# Patient Record
Sex: FEMALE | Race: WHITE | NOT HISPANIC OR LATINO | Employment: FULL TIME | URBAN - METROPOLITAN AREA
[De-identification: names, ages, dates, MRNs, and addresses within clinical notes are randomized per-mention and may not be internally consistent; named-entity substitution may affect disease eponyms.]

---

## 2023-06-07 ENCOUNTER — LAB (OUTPATIENT)
Dept: LAB | Facility: HOSPITAL | Age: 28
End: 2023-06-07
Payer: COMMERCIAL

## 2023-06-07 ENCOUNTER — TRANSCRIBE ORDERS (OUTPATIENT)
Dept: PREADMISSION TESTING | Facility: HOSPITAL | Age: 28
End: 2023-06-07
Payer: COMMERCIAL

## 2023-06-07 ENCOUNTER — TRANSCRIBE ORDERS (OUTPATIENT)
Dept: ADMINISTRATIVE | Facility: HOSPITAL | Age: 28
End: 2023-06-07
Payer: COMMERCIAL

## 2023-06-07 DIAGNOSIS — Z00.00 ROUTINE GENERAL MEDICAL EXAMINATION AT A HEALTH CARE FACILITY: ICD-10-CM

## 2023-06-07 DIAGNOSIS — Z00.00 ROUTINE GENERAL MEDICAL EXAMINATION AT A HEALTH CARE FACILITY: Primary | ICD-10-CM

## 2023-06-07 LAB
ALBUMIN SERPL-MCNC: 4.4 G/DL (ref 3.5–5.2)
ALBUMIN/GLOB SERPL: 1.5 G/DL
ALP SERPL-CCNC: 84 U/L (ref 39–117)
ALT SERPL W P-5'-P-CCNC: 28 U/L (ref 1–33)
ANION GAP SERPL CALCULATED.3IONS-SCNC: 9 MMOL/L (ref 5–15)
AST SERPL-CCNC: 22 U/L (ref 1–32)
BASOPHILS # BLD AUTO: 0.03 10*3/MM3 (ref 0–0.2)
BASOPHILS NFR BLD AUTO: 0.3 % (ref 0–1.5)
BILIRUB SERPL-MCNC: 0.5 MG/DL (ref 0–1.2)
BILIRUB UR QL STRIP: NEGATIVE
BUN SERPL-MCNC: 11 MG/DL (ref 6–20)
BUN/CREAT SERPL: 14.1 (ref 7–25)
CALCIUM SPEC-SCNC: 9.6 MG/DL (ref 8.6–10.5)
CHLORIDE SERPL-SCNC: 105 MMOL/L (ref 98–107)
CHOLEST SERPL-MCNC: 188 MG/DL (ref 0–200)
CLARITY UR: ABNORMAL
CO2 SERPL-SCNC: 25 MMOL/L (ref 22–29)
COLOR UR: YELLOW
CREAT SERPL-MCNC: 0.78 MG/DL (ref 0.57–1)
DEPRECATED RDW RBC AUTO: 38.5 FL (ref 37–54)
EGFRCR SERPLBLD CKD-EPI 2021: 105.6 ML/MIN/1.73
EOSINOPHIL # BLD AUTO: 0.08 10*3/MM3 (ref 0–0.4)
EOSINOPHIL NFR BLD AUTO: 0.8 % (ref 0.3–6.2)
ERYTHROCYTE [DISTWIDTH] IN BLOOD BY AUTOMATED COUNT: 11.9 % (ref 12.3–15.4)
GLOBULIN UR ELPH-MCNC: 2.9 GM/DL
GLUCOSE SERPL-MCNC: 112 MG/DL (ref 65–99)
GLUCOSE UR STRIP-MCNC: NEGATIVE MG/DL
HCT VFR BLD AUTO: 39.7 % (ref 34–46.6)
HDLC SERPL-MCNC: 38 MG/DL (ref 40–60)
HGB BLD-MCNC: 13.5 G/DL (ref 12–15.9)
HGB UR QL STRIP.AUTO: NEGATIVE
IMM GRANULOCYTES # BLD AUTO: 0.03 10*3/MM3 (ref 0–0.05)
IMM GRANULOCYTES NFR BLD AUTO: 0.3 % (ref 0–0.5)
KETONES UR QL STRIP: NEGATIVE
LDLC SERPL CALC-MCNC: 129 MG/DL (ref 0–100)
LDLC/HDLC SERPL: 3.34 {RATIO}
LEUKOCYTE ESTERASE UR QL STRIP.AUTO: NEGATIVE
LYMPHOCYTES # BLD AUTO: 3.34 10*3/MM3 (ref 0.7–3.1)
LYMPHOCYTES NFR BLD AUTO: 34.3 % (ref 19.6–45.3)
MCH RBC QN AUTO: 30.4 PG (ref 26.6–33)
MCHC RBC AUTO-ENTMCNC: 34 G/DL (ref 31.5–35.7)
MCV RBC AUTO: 89.4 FL (ref 79–97)
MONOCYTES # BLD AUTO: 0.68 10*3/MM3 (ref 0.1–0.9)
MONOCYTES NFR BLD AUTO: 7 % (ref 5–12)
NEUTROPHILS NFR BLD AUTO: 5.59 10*3/MM3 (ref 1.7–7)
NEUTROPHILS NFR BLD AUTO: 57.3 % (ref 42.7–76)
NITRITE UR QL STRIP: NEGATIVE
NRBC BLD AUTO-RTO: 0 /100 WBC (ref 0–0.2)
PH UR STRIP.AUTO: 6 [PH] (ref 5–8)
PLATELET # BLD AUTO: 344 10*3/MM3 (ref 140–450)
PMV BLD AUTO: 9.8 FL (ref 6–12)
POTASSIUM SERPL-SCNC: 4.3 MMOL/L (ref 3.5–5.2)
PROT SERPL-MCNC: 7.3 G/DL (ref 6–8.5)
PROT UR QL STRIP: NEGATIVE
RBC # BLD AUTO: 4.44 10*6/MM3 (ref 3.77–5.28)
SODIUM SERPL-SCNC: 139 MMOL/L (ref 136–145)
SP GR UR STRIP: 1.02 (ref 1–1.03)
TRIGL SERPL-MCNC: 116 MG/DL (ref 0–150)
UROBILINOGEN UR QL STRIP: ABNORMAL
VLDLC SERPL-MCNC: 21 MG/DL (ref 5–40)
WBC NRBC COR # BLD: 9.75 10*3/MM3 (ref 3.4–10.8)

## 2023-06-07 PROCEDURE — 85025 COMPLETE CBC W/AUTO DIFF WBC: CPT

## 2023-06-07 PROCEDURE — 36415 COLL VENOUS BLD VENIPUNCTURE: CPT

## 2023-06-07 PROCEDURE — 80061 LIPID PANEL: CPT

## 2023-06-07 PROCEDURE — 81003 URINALYSIS AUTO W/O SCOPE: CPT

## 2023-06-07 PROCEDURE — 80053 COMPREHEN METABOLIC PANEL: CPT

## 2023-06-13 ENCOUNTER — LAB (OUTPATIENT)
Dept: LAB | Facility: HOSPITAL | Age: 28
End: 2023-06-13
Payer: COMMERCIAL

## 2023-06-13 ENCOUNTER — TRANSCRIBE ORDERS (OUTPATIENT)
Dept: ADMINISTRATIVE | Facility: HOSPITAL | Age: 28
End: 2023-06-13
Payer: COMMERCIAL

## 2023-06-13 DIAGNOSIS — R73.03 PREDIABETES: ICD-10-CM

## 2023-06-13 DIAGNOSIS — R63.5 WEIGHT GAIN: ICD-10-CM

## 2023-06-13 DIAGNOSIS — R73.03 PREDIABETES: Primary | ICD-10-CM

## 2023-06-13 LAB
HBA1C MFR BLD: 5.1 % (ref 4.8–5.6)
HCG INTACT+B SERPL-ACNC: <1 MIU/ML
TSH SERPL DL<=0.05 MIU/L-ACNC: 1.89 UIU/ML (ref 0.27–4.2)

## 2023-06-13 PROCEDURE — 84702 CHORIONIC GONADOTROPIN TEST: CPT

## 2023-06-13 PROCEDURE — 84443 ASSAY THYROID STIM HORMONE: CPT

## 2023-06-13 PROCEDURE — 83036 HEMOGLOBIN GLYCOSYLATED A1C: CPT

## 2023-06-13 PROCEDURE — 36415 COLL VENOUS BLD VENIPUNCTURE: CPT

## 2023-08-04 ENCOUNTER — OFFICE VISIT (OUTPATIENT)
Dept: OBSTETRICS AND GYNECOLOGY | Age: 28
End: 2023-08-04
Payer: COMMERCIAL

## 2023-08-04 VITALS
SYSTOLIC BLOOD PRESSURE: 118 MMHG | BODY MASS INDEX: 43.74 KG/M2 | HEIGHT: 68 IN | DIASTOLIC BLOOD PRESSURE: 64 MMHG | WEIGHT: 288.6 LBS

## 2023-08-04 DIAGNOSIS — Z76.89 ENCOUNTER TO ESTABLISH CARE: ICD-10-CM

## 2023-08-04 DIAGNOSIS — Z12.4 SCREENING FOR CERVICAL CANCER: ICD-10-CM

## 2023-08-04 DIAGNOSIS — Z01.419 WELL WOMAN EXAM WITH ROUTINE GYNECOLOGICAL EXAM: Primary | ICD-10-CM

## 2023-08-04 RX ORDER — SEMAGLUTIDE 0.5 MG/.5ML
INJECTION, SOLUTION SUBCUTANEOUS
COMMUNITY
Start: 2023-07-24

## 2023-08-07 ENCOUNTER — PATIENT ROUNDING (BHMG ONLY) (OUTPATIENT)
Dept: OBSTETRICS AND GYNECOLOGY | Age: 28
End: 2023-08-07
Payer: COMMERCIAL

## 2023-08-07 NOTE — PROGRESS NOTES
A MY CHART MESSAGE HAS BEEN SENT TO THE PATIENT FOR [facility] ROUNDING.

## 2023-08-09 LAB
CONV .: NORMAL
CYTOLOGIST CVX/VAG CYTO: NORMAL
CYTOLOGY CVX/VAG DOC CYTO: NORMAL
CYTOLOGY CVX/VAG DOC THIN PREP: NORMAL
DX ICD CODE: NORMAL
HIV 1 & 2 AB SER-IMP: NORMAL
OTHER STN SPEC: NORMAL
STAT OF ADQ CVX/VAG CYTO-IMP: NORMAL

## 2023-08-29 ENCOUNTER — HOSPITAL ENCOUNTER (OUTPATIENT)
Dept: ULTRASOUND IMAGING | Facility: HOSPITAL | Age: 28
Discharge: HOME | End: 2023-08-29
Admitting: INTERNAL MEDICINE
Payer: COMMERCIAL

## 2023-08-29 ENCOUNTER — HOSPITAL ENCOUNTER (OUTPATIENT)
Dept: MAMMOGRAPHY | Facility: HOSPITAL | Age: 28
End: 2023-08-29
Payer: COMMERCIAL

## 2023-08-29 DIAGNOSIS — N63.20 MASS OF LEFT BREAST, UNSPECIFIED QUADRANT: ICD-10-CM

## 2023-08-29 PROCEDURE — 76642 ULTRASOUND BREAST LIMITED: CPT

## 2024-04-02 ENCOUNTER — LAB (OUTPATIENT)
Dept: LAB | Facility: HOSPITAL | Age: 29
End: 2024-04-02
Payer: COMMERCIAL

## 2024-04-02 ENCOUNTER — TRANSCRIBE ORDERS (OUTPATIENT)
Dept: ADMINISTRATIVE | Facility: HOSPITAL | Age: 29
End: 2024-04-02
Payer: COMMERCIAL

## 2024-04-02 DIAGNOSIS — R53.83 TIREDNESS: ICD-10-CM

## 2024-04-02 DIAGNOSIS — Z00.00 ROUTINE GENERAL MEDICAL EXAMINATION AT A HEALTH CARE FACILITY: ICD-10-CM

## 2024-04-02 DIAGNOSIS — R53.83 TIREDNESS: Primary | ICD-10-CM

## 2024-04-02 LAB
ALBUMIN SERPL-MCNC: 4.2 G/DL (ref 3.5–5.2)
ALBUMIN/GLOB SERPL: 1.6 G/DL
ALP SERPL-CCNC: 71 U/L (ref 39–117)
ALT SERPL W P-5'-P-CCNC: 10 U/L (ref 1–33)
ANION GAP SERPL CALCULATED.3IONS-SCNC: 8.1 MMOL/L (ref 5–15)
AST SERPL-CCNC: 20 U/L (ref 1–32)
BASOPHILS # BLD AUTO: 0.04 10*3/MM3 (ref 0–0.2)
BASOPHILS NFR BLD AUTO: 0.5 % (ref 0–1.5)
BILIRUB SERPL-MCNC: 0.4 MG/DL (ref 0–1.2)
BILIRUB UR QL STRIP: NEGATIVE
BUN SERPL-MCNC: 17 MG/DL (ref 6–20)
BUN/CREAT SERPL: 23.9 (ref 7–25)
CALCIUM SPEC-SCNC: 9.2 MG/DL (ref 8.6–10.5)
CHLORIDE SERPL-SCNC: 104 MMOL/L (ref 98–107)
CHOLEST SERPL-MCNC: 148 MG/DL (ref 0–200)
CLARITY UR: CLEAR
CO2 SERPL-SCNC: 24.9 MMOL/L (ref 22–29)
COLOR UR: YELLOW
CREAT SERPL-MCNC: 0.71 MG/DL (ref 0.57–1)
DEPRECATED RDW RBC AUTO: 38.9 FL (ref 37–54)
EGFRCR SERPLBLD CKD-EPI 2021: 117.5 ML/MIN/1.73
EOSINOPHIL # BLD AUTO: 0.07 10*3/MM3 (ref 0–0.4)
EOSINOPHIL NFR BLD AUTO: 0.8 % (ref 0.3–6.2)
ERYTHROCYTE [DISTWIDTH] IN BLOOD BY AUTOMATED COUNT: 11.8 % (ref 12.3–15.4)
GLOBULIN UR ELPH-MCNC: 2.7 GM/DL
GLUCOSE SERPL-MCNC: 83 MG/DL (ref 65–99)
GLUCOSE UR STRIP-MCNC: NEGATIVE MG/DL
HBA1C MFR BLD: 4.8 % (ref 4.8–5.6)
HCT VFR BLD AUTO: 39.4 % (ref 34–46.6)
HDLC SERPL-MCNC: 35 MG/DL (ref 40–60)
HGB BLD-MCNC: 13.2 G/DL (ref 12–15.9)
HGB UR QL STRIP.AUTO: NEGATIVE
IMM GRANULOCYTES # BLD AUTO: 0.01 10*3/MM3 (ref 0–0.05)
IMM GRANULOCYTES NFR BLD AUTO: 0.1 % (ref 0–0.5)
KETONES UR QL STRIP: NEGATIVE
LDLC SERPL CALC-MCNC: 98 MG/DL (ref 0–100)
LDLC/HDLC SERPL: 2.78 {RATIO}
LEUKOCYTE ESTERASE UR QL STRIP.AUTO: NEGATIVE
LYMPHOCYTES # BLD AUTO: 2.73 10*3/MM3 (ref 0.7–3.1)
LYMPHOCYTES NFR BLD AUTO: 32.7 % (ref 19.6–45.3)
MCH RBC QN AUTO: 30.4 PG (ref 26.6–33)
MCHC RBC AUTO-ENTMCNC: 33.5 G/DL (ref 31.5–35.7)
MCV RBC AUTO: 90.8 FL (ref 79–97)
MONOCYTES # BLD AUTO: 0.68 10*3/MM3 (ref 0.1–0.9)
MONOCYTES NFR BLD AUTO: 8.1 % (ref 5–12)
NEUTROPHILS NFR BLD AUTO: 4.82 10*3/MM3 (ref 1.7–7)
NEUTROPHILS NFR BLD AUTO: 57.8 % (ref 42.7–76)
NITRITE UR QL STRIP: NEGATIVE
NRBC BLD AUTO-RTO: 0 /100 WBC (ref 0–0.2)
PH UR STRIP.AUTO: 6.5 [PH] (ref 5–8)
PLATELET # BLD AUTO: 283 10*3/MM3 (ref 140–450)
PMV BLD AUTO: 9.9 FL (ref 6–12)
POTASSIUM SERPL-SCNC: 4.2 MMOL/L (ref 3.5–5.2)
PROT SERPL-MCNC: 6.9 G/DL (ref 6–8.5)
PROT UR QL STRIP: NEGATIVE
RBC # BLD AUTO: 4.34 10*6/MM3 (ref 3.77–5.28)
SODIUM SERPL-SCNC: 137 MMOL/L (ref 136–145)
SP GR UR STRIP: 1.02 (ref 1–1.03)
TRIGL SERPL-MCNC: 78 MG/DL (ref 0–150)
TSH SERPL DL<=0.05 MIU/L-ACNC: 1.31 UIU/ML (ref 0.27–4.2)
UROBILINOGEN UR QL STRIP: NORMAL
VLDLC SERPL-MCNC: 15 MG/DL (ref 5–40)
WBC NRBC COR # BLD AUTO: 8.35 10*3/MM3 (ref 3.4–10.8)

## 2024-04-02 PROCEDURE — 80061 LIPID PANEL: CPT

## 2024-04-02 PROCEDURE — 80050 GENERAL HEALTH PANEL: CPT

## 2024-04-02 PROCEDURE — 36415 COLL VENOUS BLD VENIPUNCTURE: CPT

## 2024-04-02 PROCEDURE — 81003 URINALYSIS AUTO W/O SCOPE: CPT

## 2024-04-02 PROCEDURE — 83036 HEMOGLOBIN GLYCOSYLATED A1C: CPT

## 2024-08-09 ENCOUNTER — OFFICE VISIT (OUTPATIENT)
Dept: OBSTETRICS AND GYNECOLOGY | Age: 29
End: 2024-08-09
Payer: COMMERCIAL

## 2024-08-09 VITALS
BODY MASS INDEX: 31.71 KG/M2 | SYSTOLIC BLOOD PRESSURE: 128 MMHG | HEIGHT: 68 IN | WEIGHT: 209.2 LBS | DIASTOLIC BLOOD PRESSURE: 84 MMHG

## 2024-08-09 DIAGNOSIS — Z01.419 WELL WOMAN EXAM WITH ROUTINE GYNECOLOGICAL EXAM: Primary | ICD-10-CM

## 2024-08-09 PROCEDURE — 99395 PREV VISIT EST AGE 18-39: CPT | Performed by: STUDENT IN AN ORGANIZED HEALTH CARE EDUCATION/TRAINING PROGRAM

## 2024-08-09 RX ORDER — MELOXICAM 15 MG/1
1 TABLET ORAL DAILY
COMMUNITY
Start: 2024-05-01

## 2024-08-09 RX ORDER — SEMAGLUTIDE 2.4 MG/.75ML
INJECTION, SOLUTION SUBCUTANEOUS
COMMUNITY
Start: 2024-07-20

## 2024-08-09 NOTE — PROGRESS NOTES
[facility]   Obstetrics and Gynecology   Routine Annual Visit    2024    Patient: Gladys Tate          MR#:[id number]    History of Present Illness    Chief Complaint   Patient presents with    Routine Prenatal Visit     AE today, Last AE 2023 w/pap nml, No problems today       29 y.o. female  who presents for annual exam.  Reports regular monthly menses without issue.  Using condoms for contraception.  Thinking about trying to conceive.  She has been using Wegovy and lost >100#.  Her main goal was optimize health for pregnancy so she is excited.  She wants to discuss when to stop Wegovy in preparation for pregnancy.  She has not yet started prenatal vitamin.    Fhx PGM with breast cancer (diagnosed in mid-70s)  US Breast Left Limited (2023 09:04) normal breast US     -Older sister with T21, mother was in late-30s when she was born  -Younger brother born with two holes in heart and had surgical repair soon after birth     Studies reviewed:  IGP,rfx Aptima HPV All Pth (2023 14:33)  NIL, denies h/o abnormal paps, s/p gardasil    Obstetric History:  OB History          0    Para   0    Term   0       0    AB   0    Living   0         SAB   0    IAB   0    Ectopic   0    Molar        Multiple   0    Live Births                   Menstrual History:     Patient's last menstrual period was 2024 (exact date).       Sexual History:   Sexually active with , declines STD testing       Social History:   Behavior analyst for [employer]  Has one foster son at home  Planning trip to [place] this year - [place], [place], [place]  ________________________________________  There is no problem list on file for this patient.    Past Medical History:   Diagnosis Date    No pertinent past medical history      Past Surgical History:   Procedure Laterality Date    BILATERAL BREAST REDUCTION Bilateral 2014    WISDOM TOOTH EXTRACTION       Social History     Tobacco  "Use   Smoking Status Never    Passive exposure: Never   Smokeless Tobacco Never     Family History   Problem Relation Age of Onset    Hypertension Father     Hyperlipidemia Father     No Known Problems Mother     Breast cancer Paternal Grandmother 70    Ovarian cancer Neg Hx     Uterine cancer Neg Hx     Colon cancer Neg Hx      Prior to Admission medications    Medication Sig Start Date End Date Taking? Authorizing Provider   meloxicam (MOBIC) 15 MG tablet Take 1 tablet by mouth Daily. 5/1/24  Yes Provider, Historical, MD   Wegovy 2.4 MG/0.75ML solution auto-injector INJECT THE CONTENTS OF ONE PEN BELOW THE SKIN INTO THE APPROPRIATE AREA ONCE WEEKLY AS DIRECTED 7/20/24  Yes Toan Thomas MD Wegovy 0.5 MG/0.5ML solution auto-injector  7/24/23   Toan Thomas MD     ________________________________________    Current contraception: condoms  History of abnormal Pap smear: no  Family history of uterine or ovarian cancer: no  Family History of colon cancer/colon polyps: no  History of abnormal mammogram: no    The following portions of the patient's history were reviewed and updated as appropriate: allergies, current medications, past family history, past medical history, past social history, past surgical history, and problem list.    Review of Systems   All other systems reviewed and are negative.           Objective     /84   Ht 172.7 cm (68\")   Wt 94.9 kg (209 lb 3.2 oz)   LMP 07/23/2024 (Exact Date)   Breastfeeding No   BMI 31.81 kg/m²    BP Readings from Last 3 Encounters:   08/09/24 128/84   08/04/23 118/64   05/10/19 107/72      Wt Readings from Last 3 Encounters:   08/09/24 94.9 kg (209 lb 3.2 oz)   08/04/23 131 kg (288 lb 9.6 oz)   05/10/19 130 kg (286 lb)        BMI: Estimated body mass index is 31.81 kg/m² as calculated from the following:    Height as of this encounter: 172.7 cm (68\").    Weight as of this encounter: 94.9 kg (209 lb 3.2 oz).    Physical Exam  Vitals and nursing " note reviewed.   Constitutional:       General: She is not in acute distress.     Appearance: Normal appearance.   HENT:      Head: Normocephalic and atraumatic.   Eyes:      Extraocular Movements: Extraocular movements intact.   Cardiovascular:      Rate and Rhythm: Normal rate and regular rhythm.      Pulses: Normal pulses.      Heart sounds: No murmur heard.  Pulmonary:      Effort: Pulmonary effort is normal. No respiratory distress.      Breath sounds: Normal breath sounds.   Chest:   Breasts:     Right: Normal. No mass, nipple discharge, skin change or tenderness.      Left: Normal. No mass, nipple discharge, skin change or tenderness.   Abdominal:      General: There is no distension.      Palpations: Abdomen is soft. There is no mass.      Tenderness: There is no abdominal tenderness.   Genitourinary:     General: Normal vulva.      Labia:         Right: No rash or lesion.         Left: No rash or lesion.       Urethra: No prolapse, urethral swelling or urethral lesion.      Vagina: Normal.      Cervix: Normal.      Uterus: Normal.       Adnexa: Right adnexa normal and left adnexa normal.      Comments: Bladder: no masses or tenderness  Perineum/Anus: no masses, lesions, or skin changes  Musculoskeletal:         General: No swelling. Normal range of motion.      Cervical back: Normal range of motion.   Lymphadenopathy:      Upper Body:      Right upper body: No axillary adenopathy.      Left upper body: No axillary adenopathy.   Skin:     General: Skin is warm and dry.   Neurological:      General: No focal deficit present.      Mental Status: She is alert and oriented to person, place, and time.   Psychiatric:         Mood and Affect: Mood normal.         Behavior: Behavior normal.         As part of wellness and prevention, the following topics were discussed with the patient:  Encouraged self breast exam  Physical activity and regular exercised encouraged.   Injury prevention discussed.  Healthy weight  discussed.  Nutrition discussed.  Substance abuse/misuse discussed.  Sexual behavior/safe practices discussed.   Sexual transmitted disease prevention   Contraception discussed.   Mental health discussed.           Assessment:  Diagnoses and all orders for this visit:    1. Well woman exam with routine gynecological exam (Primary)    -Breast and pelvic exam normal  - Pap up-to-date  - Declined STD screen  - Discussed starting prenatal vitamin about.  Discussed option of referral to genetic counselor for consultation regarding family history of Down syndrome and karyotyping but patient declines.  Discussed that Down syndrome is most often a random mutation.  - Discussed that Wegovy is not recommended in pregnancy but the fetal risks have not been fully delineated by research.  She has achieved significant weight loss with Wegovy which we will absolutely improve her risks and experience of pregnancy.  She is okay for pregnancy whenever she likes.  I recommend stopping Wegovy before conceiving ideally but definitely if she has a positive UPT.      Plan:  Return in about 1 year (around 8/9/2025) for Annual.      Maddy Perea MD  8/9/2024 17:02 EDT

## 2025-01-13 ENCOUNTER — LAB (OUTPATIENT)
Dept: LAB | Facility: HOSPITAL | Age: 30
End: 2025-01-13
Payer: COMMERCIAL

## 2025-01-13 ENCOUNTER — OFFICE VISIT (OUTPATIENT)
Dept: INTERNAL MEDICINE | Facility: CLINIC | Age: 30
End: 2025-01-13
Payer: COMMERCIAL

## 2025-01-13 VITALS
WEIGHT: 196 LBS | BODY MASS INDEX: 29.7 KG/M2 | OXYGEN SATURATION: 97 % | RESPIRATION RATE: 16 BRPM | DIASTOLIC BLOOD PRESSURE: 70 MMHG | TEMPERATURE: 97.8 F | HEART RATE: 77 BPM | HEIGHT: 68 IN | SYSTOLIC BLOOD PRESSURE: 118 MMHG

## 2025-01-13 DIAGNOSIS — Z23 FLU VACCINE NEED: ICD-10-CM

## 2025-01-13 DIAGNOSIS — Z00.00 HEALTHCARE MAINTENANCE: ICD-10-CM

## 2025-01-13 DIAGNOSIS — R53.83 FATIGUE, UNSPECIFIED TYPE: ICD-10-CM

## 2025-01-13 DIAGNOSIS — G62.9 NEUROPATHY: ICD-10-CM

## 2025-01-13 DIAGNOSIS — R63.4 WEIGHT LOSS: Primary | ICD-10-CM

## 2025-01-13 PROBLEM — E66.9 OBESITY (BMI 30-39.9): Status: ACTIVE | Noted: 2025-01-13

## 2025-01-13 LAB
ALBUMIN SERPL-MCNC: 4.4 G/DL (ref 3.5–5.2)
ALBUMIN/GLOB SERPL: 1.5 G/DL
ALP SERPL-CCNC: 59 U/L (ref 39–117)
ALT SERPL W P-5'-P-CCNC: 13 U/L (ref 1–33)
ANION GAP SERPL CALCULATED.3IONS-SCNC: 11.9 MMOL/L (ref 5–15)
AST SERPL-CCNC: 23 U/L (ref 1–32)
BILIRUB SERPL-MCNC: 0.7 MG/DL (ref 0–1.2)
BUN SERPL-MCNC: 13 MG/DL (ref 6–20)
BUN/CREAT SERPL: 17.6 (ref 7–25)
CALCIUM SPEC-SCNC: 9.4 MG/DL (ref 8.6–10.5)
CHLORIDE SERPL-SCNC: 101 MMOL/L (ref 98–107)
CO2 SERPL-SCNC: 25.1 MMOL/L (ref 22–29)
CREAT SERPL-MCNC: 0.74 MG/DL (ref 0.57–1)
EGFRCR SERPLBLD CKD-EPI 2021: 111.8 ML/MIN/1.73
GLOBULIN UR ELPH-MCNC: 2.9 GM/DL
GLUCOSE SERPL-MCNC: 81 MG/DL (ref 65–99)
HCT VFR BLD AUTO: 40.6 % (ref 34–46.6)
HCV AB SER QL: NORMAL
HGB BLD-MCNC: 13.6 G/DL (ref 12–15.9)
POTASSIUM SERPL-SCNC: 4.1 MMOL/L (ref 3.5–5.2)
PROT SERPL-MCNC: 7.3 G/DL (ref 6–8.5)
SODIUM SERPL-SCNC: 138 MMOL/L (ref 136–145)
VIT B12 BLD-MCNC: 722 PG/ML (ref 211–946)

## 2025-01-13 PROCEDURE — 85018 HEMOGLOBIN: CPT | Performed by: INTERNAL MEDICINE

## 2025-01-13 PROCEDURE — 86803 HEPATITIS C AB TEST: CPT | Performed by: INTERNAL MEDICINE

## 2025-01-13 PROCEDURE — 99204 OFFICE O/P NEW MOD 45 MIN: CPT | Performed by: INTERNAL MEDICINE

## 2025-01-13 PROCEDURE — 90656 IIV3 VACC NO PRSV 0.5 ML IM: CPT | Performed by: INTERNAL MEDICINE

## 2025-01-13 PROCEDURE — 80053 COMPREHEN METABOLIC PANEL: CPT | Performed by: INTERNAL MEDICINE

## 2025-01-13 PROCEDURE — 90471 IMMUNIZATION ADMIN: CPT | Performed by: INTERNAL MEDICINE

## 2025-01-13 PROCEDURE — 85014 HEMATOCRIT: CPT | Performed by: INTERNAL MEDICINE

## 2025-01-13 PROCEDURE — 82607 VITAMIN B-12: CPT | Performed by: INTERNAL MEDICINE

## 2025-01-13 PROCEDURE — 36415 COLL VENOUS BLD VENIPUNCTURE: CPT | Performed by: INTERNAL MEDICINE

## 2025-01-13 RX ORDER — SEMAGLUTIDE 1.7 MG/.75ML
1.7 INJECTION, SOLUTION SUBCUTANEOUS WEEKLY
Qty: 3 ML | Refills: 6 | Status: SHIPPED | OUTPATIENT
Start: 2025-01-13

## 2025-01-13 RX ORDER — SEMAGLUTIDE 1.7 MG/.75ML
1.7 INJECTION, SOLUTION SUBCUTANEOUS WEEKLY
COMMUNITY
End: 2025-01-13 | Stop reason: SDUPTHER

## 2025-01-13 RX ORDER — MELOXICAM 15 MG/1
15 TABLET ORAL DAILY
Qty: 30 TABLET | Refills: 11 | Status: SHIPPED | OUTPATIENT
Start: 2025-01-13

## 2025-01-13 NOTE — PROGRESS NOTES
"Chief Complaint  WEGOVY F/U    Subjective        Gladys Tate presents to [facility]  History of Present Illness     History of Present Illness  The patient is a 29-year-old female who presents for a follow-up on obesity and maintenance of Wegovy.    She reports no adverse effects from the medication, with regular bowel movements and no instances of bloating or belching. Her current weight is 196 pounds, a significant decrease from her initial weight of 306 pounds. She has experienced episodes of lightheadedness, which she attributes to dietary changes. Additionally, she reports a sensation of instability in her legs upon standing, necessitating a brief pause before resuming movement. This symptom is not associated with any tingling sensation and does not interfere with her ability to walk. She maintains a regular exercise regimen, utilizing a treadmill and Peloton bike. She is currently on a 1.7 mg dose of Wegovy. She is not experiencing any difficulty swallowing.    She is actively trying to conceive and is not using any form of birth control, including condoms. Her menstrual cycles are regular and of normal flow. She is compliant with her prenatal vitamin regimen.    She takes meloxicam as needed, with decreased frequency following her weight loss. She last took it approximately one week ago for foot pain, which provided relief.    MEDICATIONS  Current: Wegovy, meloxicam    IMMUNIZATIONS  She has not received her influenza vaccine.      Objective   Vital Signs:  /70   Pulse 77   Temp 97.8 °F (36.6 °C)   Resp 16   Ht 172.7 cm (67.99\")   Wt 88.9 kg (196 lb)   SpO2 97%   BMI 29.81 kg/m²   Estimated body mass index is 29.81 kg/m² as calculated from the following:    Height as of this encounter: 172.7 cm (67.99\").    Weight as of this encounter: 88.9 kg (196 lb).            Past Medical History:   Diagnosis Date    Macromastia 06/03/2014    No pertinent past " medical history     Obesity         Family History   Problem Relation Age of Onset    Hypertension Father     Hyperlipidemia Father     No Known Problems Mother     Breast cancer Paternal Grandmother 70    Cancer Paternal Grandmother     Hypertension Paternal Grandmother     Hypertension Maternal Grandfather     Developmental Disability Sister     Developmental Disability Brother     Ovarian cancer Neg Hx     Uterine cancer Neg Hx     Colon cancer Neg Hx         Social History     Socioeconomic History    Marital status:    Tobacco Use    Smoking status: Never     Passive exposure: Never    Smokeless tobacco: Never   Vaping Use    Vaping status: Never Used   Substance and Sexual Activity    Alcohol use: Yes     Comment: Socially    Drug use: No    Sexual activity: Yes     Partners: Male     Birth control/protection: Condom        Review of Systems     Physical Exam  Vitals reviewed.   Constitutional:       Appearance: Normal appearance.   HENT:      Head: Normocephalic and atraumatic.   Eyes:      Extraocular Movements: Extraocular movements intact.      Conjunctiva/sclera: Conjunctivae normal.      Pupils: Pupils are equal, round, and reactive to light.   Cardiovascular:      Rate and Rhythm: Normal rate and regular rhythm.      Pulses: Normal pulses.      Heart sounds: Normal heart sounds.   Pulmonary:      Effort: Pulmonary effort is normal.      Breath sounds: Normal breath sounds.   Abdominal:      General: Bowel sounds are normal.      Palpations: Abdomen is soft.   Musculoskeletal:         General: Normal range of motion.      Cervical back: Normal range of motion and neck supple.   Skin:     General: Skin is warm and dry.   Neurological:      General: No focal deficit present.      Mental Status: She is alert. Mental status is at baseline.   Psychiatric:         Mood and Affect: Mood normal.         Behavior: Behavior normal.         Thought Content: Thought content normal.          Result Review  :                Assessment and Plan   Diagnoses and all orders for this visit:    1. Weight loss (Primary)  -     Comprehensive metabolic panel  -     Wegovy 1.7 MG/0.75ML solution auto-injector; 0.75 mL by Subdermal route 1 (One) Time Per Week.  Dispense: 3 mL; Refill: 6    2. Neuropathy  -     meloxicam (MOBIC) 15 MG tablet; Take 1 tablet by mouth Daily.  Dispense: 30 tablet; Refill: 11    3. Fatigue, unspecified type  -     Vitamin B12  -     Hemoglobin and hematocrit, blood    4. Healthcare maintenance  -     Hepatitis C antibody    5. Flu vaccine need  -     Fluzone >6mos ([id number])        Assessment & Plan  1. Obesity.  She has achieved a significant weight loss of 110 pounds, reducing her weight from 306 to 196 pounds. Her current BMI is 29.8. The goal is to further reduce her weight by an additional 20 pounds, aiming for a BMI of 25. A comprehensive metabolic panel (CMP), complete blood count (CBC), and vitamin level assessment will be conducted today to rule out malnutrition. She is advised to continue her current regimen of Wegovy 1.7 mg. A prescription for Wegovy will be renewed. If she becomes pregnant, she should discontinue Wegovy immediately.    2. Prenatal care.  She is currently trying to conceive and is not using any form of birth control. She is advised to stop Wegovy immediately upon confirmation of pregnancy. She is currently taking a prenatal vitamin and should continue to do so.    3. Medication management.  A prescription for meloxicam will be renewed for use as needed.    4. Health maintenance.  She will receive her influenza vaccine today. She is advised to get her COVID-19 vaccine when available.    Follow-up  The patient will follow up in 6 months.            Follow Up   Return in about 6 months (around 7/13/2025).  Patient was given instructions and counseling regarding her condition or for health maintenance advice. Please see specific information pulled into the AVS if appropriate.            Patient or patient representative verbalized consent for the use of Ambient Listening during the visit with  Reji Yang MD for chart documentation. 1/13/2025  10:47 EST

## 2025-02-28 ENCOUNTER — PATIENT MESSAGE (OUTPATIENT)
Dept: OBSTETRICS AND GYNECOLOGY | Age: 30
End: 2025-02-28

## 2025-02-28 ENCOUNTER — OFFICE VISIT (OUTPATIENT)
Dept: OBSTETRICS AND GYNECOLOGY | Age: 30
End: 2025-02-28
Payer: COMMERCIAL

## 2025-02-28 VITALS
SYSTOLIC BLOOD PRESSURE: 120 MMHG | WEIGHT: 208.2 LBS | BODY MASS INDEX: 31.55 KG/M2 | DIASTOLIC BLOOD PRESSURE: 76 MMHG | HEIGHT: 68 IN

## 2025-02-28 DIAGNOSIS — Z32.00 ENCOUNTER FOR CONFIRMATION OF PREGNANCY TEST RESULT WITH PHYSICAL EXAMINATION: Primary | ICD-10-CM

## 2025-02-28 DIAGNOSIS — Z82.79 FHX: DOWN'S SYNDROME: ICD-10-CM

## 2025-02-28 DIAGNOSIS — Z13.89 SCREENING FOR HEMATURIA OR PROTEINURIA: ICD-10-CM

## 2025-02-28 DIAGNOSIS — Z34.80 SUPERVISION OF OTHER NORMAL PREGNANCY, ANTEPARTUM: ICD-10-CM

## 2025-02-28 DIAGNOSIS — Z82.79 FHX: CONGENITAL HEART DISEASE: ICD-10-CM

## 2025-02-28 DIAGNOSIS — O99.210 OBESITY IN PREGNANCY, ANTEPARTUM: ICD-10-CM

## 2025-02-28 PROBLEM — Z00.00 HEALTHCARE MAINTENANCE: Status: RESOLVED | Noted: 2025-01-13 | Resolved: 2025-02-28

## 2025-02-28 PROBLEM — E66.9 OBESITY (BMI 30-39.9): Status: RESOLVED | Noted: 2025-01-13 | Resolved: 2025-02-28

## 2025-02-28 LAB
CLARITY, POC: CLEAR
COLOR UR: YELLOW
GLUCOSE UR STRIP-MCNC: NEGATIVE MG/DL
PROT UR STRIP-MCNC: NEGATIVE MG/DL

## 2025-02-28 RX ORDER — PRENATAL VIT NO.126/IRON/FOLIC 28MG-0.8MG
TABLET ORAL DAILY
COMMUNITY

## 2025-02-28 RX ORDER — DIPHENOXYLATE HYDROCHLORIDE AND ATROPINE SULFATE 2.5; .025 MG/1; MG/1
TABLET ORAL DAILY
COMMUNITY

## 2025-02-28 NOTE — PROGRESS NOTES
[facility]   Obstetrics and Gynecology   New Obstetric Visit    2025    Patient: Gladys Tate          MR#:[id number]    History of Present Illness    Chief Complaint   Patient presents with    Possible Pregnancy     Pregnancy confirmation LMP 2024 U/S today, No problems today       29 y.o. female  at 8w4d presents for NOB visit.  She is doing well today.  Taking prenatal vitamins.  She is here with FOB Gennaro.  They are very excited for the pregnancy.    ________________________________________  Patient Active Problem List   Diagnosis    Obesity in pregnancy, antepartum    FHx: Down's syndrome    FHx: congenital heart disease    Supervision of other normal pregnancy, antepartum     OB History          1    Para   0    Term   0       0    AB   0    Living   0         SAB   0    IAB   0    Ectopic   0    Molar        Multiple   0    Live Births                      Social History:  Denies h/o herpes, syphilis, HIV  See Fhx below    Past Medical History:   Diagnosis Date    Macromastia 2014    Obesity      Past Surgical History:   Procedure Laterality Date    BILATERAL BREAST REDUCTION Bilateral     WISDOM TOOTH EXTRACTION       Social History     Tobacco Use   Smoking Status Never    Passive exposure: Never   Smokeless Tobacco Never     Family History   Problem Relation Age of Onset    Hypertension Father     Hyperlipidemia Father     No Known Problems Mother     Breast cancer Paternal Grandmother 70    Cancer Paternal Grandmother     Hypertension Paternal Grandmother     Hypertension Maternal Grandfather     Developmental Disability Sister     Developmental Disability Brother     Ovarian cancer Neg Hx     Uterine cancer Neg Hx     Colon cancer Neg Hx      Prior to Admission medications    Medication Sig Start Date End Date Taking? Authorizing Provider   meloxicam (MOBIC) 15 MG tablet Take 1 tablet by mouth Daily. 25  Yes Reji Yang MD  "  multivitamin (MULTIVITAMIN PO) Take  by mouth Daily.   Yes Provider, MD Toan   prenatal vitamin (prenatal, CLASSIC, vitamin) tablet Take  by mouth Daily.   Yes ProviderToan MD   Probiotic Product (PROBIOTIC PO) Take  by mouth.   Yes ProviderToan MD Wegovy 1.7 MG/0.75ML solution auto-injector 0.75 mL by Subdermal route 1 (One) Time Per Week. 1/13/25 2/28/25  Reji Yang MD     ________________________________________    The following portions of the patient's history were reviewed and updated as appropriate: allergies, current medications, past family history, past medical history, past social history, past surgical history, and problem list.    Review of Systems   All other systems reviewed and are negative.           Objective     /76   Ht 172.7 cm (68\")   Wt 94.4 kg (208 lb 3.2 oz)   LMP 12/30/2024 (Exact Date)   Breastfeeding No   BMI 31.66 kg/m²    BP Readings from Last 3 Encounters:   02/28/25 120/76   01/13/25 118/70   08/09/24 128/84      Wt Readings from Last 3 Encounters:   02/28/25 94.4 kg (208 lb 3.2 oz)   01/13/25 88.9 kg (196 lb)   08/09/24 94.9 kg (209 lb 3.2 oz)        BMI: Estimated body mass index is 31.66 kg/m² as calculated from the following:    Height as of this encounter: 172.7 cm (68\").    Weight as of this encounter: 94.4 kg (208 lb 3.2 oz).    Physical Exam  Vitals and nursing note reviewed.   Constitutional:       General: She is not in acute distress.  Pulmonary:      Effort: Pulmonary effort is normal. No respiratory distress.   Neurological:      General: No focal deficit present.      Mental Status: She is alert and oriented to person, place, and time.   Psychiatric:         Mood and Affect: Mood normal.         Behavior: Behavior normal.         Thought Content: Thought content normal.         Judgment: Judgment normal.           Assessment:  Diagnoses and all orders for this visit:    1. Encounter for confirmation of pregnancy test result " "with physical examination (Primary)  -     Varicella Zoster Antibody, IgG  -     Chlamydia trachomatis, Neisseria gonorrhoeae, Trichomonas vaginalis, PCR - Swab, Vagina  -     Urine Culture - Urine, Urine, Clean Catch  -     POC Urinalysis Dipstick    2. FHx: Down's syndrome  Overview:  -Older sister with T21.  No other affected siblings.  -Discussed most likely random mutation but rarely can be hereditary.  Plan for cfDNA.      3. FHx: congenital heart disease  Overview:  -Younger brother born with \"2 holes in heart\" that required surgical repair soon after birth.  -Since first degree relative of fetus is not affected, no echo required.  We will plan for anatomy US here to determine if MFM referral needed.      4. Obesity in pregnancy, antepartum  Overview:  -TSH and A1c today  -Plan for serial growth US > 28 wga and weekly BPP > 36wga    Orders:  -     TSH Rfx On Abnormal To Free T4  -     Hemoglobin A1c    5. Supervision of other normal pregnancy, antepartum  Overview:  -PNL: collect today  -Genetics: screening options reviewed, Dez pamphlet provided, desires cfDNA/carrier in 2 wks, plan for msAFP > 16wga, plan for anatomy Us at 18-22 wga  -Pap: NIL 8/4/23  -Imm: titers today, tdap > 27wga  -Contraception: discuss at future visit  -Birthplan: discuss at future visit  -Care coordination: accepts blood transfusions, no latex allergy, call schedule reviewed      Orders:  -     OB Panel With HIV and RPR  -     TSH Rfx On Abnormal To Free T4  -     Hemoglobin A1c    6. Screening for hematuria or proteinuria  -     Urine Culture - Urine, Urine, Clean Catch  -     POC Urinalysis Dipstick          Plan:  Return for lab appt 2 wks for carrier screen/cfDNA with sex, appt with me in 4wks.      Maddy Perea MD  2/28/2025 13:41 EST    "

## 2025-03-01 PROBLEM — O09.899 RUBELLA NON-IMMUNE STATUS, ANTEPARTUM: Status: ACTIVE | Noted: 2025-03-01

## 2025-03-01 PROBLEM — Z28.39 RUBELLA NON-IMMUNE STATUS, ANTEPARTUM: Status: ACTIVE | Noted: 2025-03-01

## 2025-03-01 LAB
ABO GROUP BLD: ABNORMAL
BASOPHILS # BLD AUTO: 0 X10E3/UL (ref 0–0.2)
BASOPHILS NFR BLD AUTO: 0 %
BLD GP AB SCN SERPL QL: NEGATIVE
EOSINOPHIL # BLD AUTO: 0.1 X10E3/UL (ref 0–0.4)
EOSINOPHIL NFR BLD AUTO: 0 %
ERYTHROCYTE [DISTWIDTH] IN BLOOD BY AUTOMATED COUNT: 11.7 % (ref 11.7–15.4)
HBA1C MFR BLD: 5 % (ref 4.8–5.6)
HBV SURFACE AG SERPL QL IA: NEGATIVE
HCT VFR BLD AUTO: 37 % (ref 34–46.6)
HCV AB SERPL QL IA: NORMAL
HCV IGG SERPL QL IA: NON REACTIVE
HGB BLD-MCNC: 12.7 G/DL (ref 11.1–15.9)
HIV 1+2 AB+HIV1 P24 AG SERPL QL IA: NON REACTIVE
IMM GRANULOCYTES # BLD AUTO: 0 X10E3/UL (ref 0–0.1)
IMM GRANULOCYTES NFR BLD AUTO: 0 %
LYMPHOCYTES # BLD AUTO: 3 X10E3/UL (ref 0.7–3.1)
LYMPHOCYTES NFR BLD AUTO: 25 %
MCH RBC QN AUTO: 31.5 PG (ref 26.6–33)
MCHC RBC AUTO-ENTMCNC: 34.3 G/DL (ref 31.5–35.7)
MCV RBC AUTO: 92 FL (ref 79–97)
MONOCYTES # BLD AUTO: 0.7 X10E3/UL (ref 0.1–0.9)
MONOCYTES NFR BLD AUTO: 6 %
NEUTROPHILS # BLD AUTO: 7.8 X10E3/UL (ref 1.4–7)
NEUTROPHILS NFR BLD AUTO: 69 %
PLATELET # BLD AUTO: 304 X10E3/UL (ref 150–450)
RBC # BLD AUTO: 4.03 X10E6/UL (ref 3.77–5.28)
RH BLD: POSITIVE
RPR SER QL: NON REACTIVE
RUBV IGG SERPL IA-ACNC: <0.9 INDEX
TSH SERPL DL<=0.005 MIU/L-ACNC: 1.12 UIU/ML (ref 0.45–4.5)
VZV IGG SER QL IA: REACTIVE
WBC # BLD AUTO: 11.6 X10E3/UL (ref 3.4–10.8)

## 2025-03-01 NOTE — PROGRESS NOTES
Please call patient to let her know her OB panel was all normal.  She is not immune to rubella so I will recommend updating MMR vaccine postpartum.    Thanks!  Maddy Perea MD  03/01/25 12:28 EST

## 2025-03-02 LAB
BACTERIA UR CULT: NO GROWTH
BACTERIA UR CULT: NORMAL

## 2025-03-04 LAB
C TRACH RRNA SPEC QL NAA+PROBE: NEGATIVE
N GONORRHOEA RRNA SPEC QL NAA+PROBE: NEGATIVE
T VAGINALIS RRNA SPEC QL NAA+PROBE: NEGATIVE

## 2025-03-28 ENCOUNTER — ROUTINE PRENATAL (OUTPATIENT)
Dept: OBSTETRICS AND GYNECOLOGY | Age: 30
End: 2025-03-28
Payer: COMMERCIAL

## 2025-03-28 VITALS — SYSTOLIC BLOOD PRESSURE: 122 MMHG | BODY MASS INDEX: 32.63 KG/M2 | DIASTOLIC BLOOD PRESSURE: 70 MMHG | WEIGHT: 214.6 LBS

## 2025-03-28 DIAGNOSIS — Z14.8 CARRIER OF GALACTOSEMIA: ICD-10-CM

## 2025-03-28 DIAGNOSIS — Z28.39 RUBELLA NON-IMMUNE STATUS, ANTEPARTUM: ICD-10-CM

## 2025-03-28 DIAGNOSIS — O09.899 RUBELLA NON-IMMUNE STATUS, ANTEPARTUM: ICD-10-CM

## 2025-03-28 DIAGNOSIS — Z82.79 FHX: DOWN'S SYNDROME: ICD-10-CM

## 2025-03-28 DIAGNOSIS — O99.210 OBESITY IN PREGNANCY, ANTEPARTUM: ICD-10-CM

## 2025-03-28 DIAGNOSIS — Z3A.12 12 WEEKS GESTATION OF PREGNANCY: Primary | ICD-10-CM

## 2025-03-28 DIAGNOSIS — Z34.80 SUPERVISION OF OTHER NORMAL PREGNANCY, ANTEPARTUM: ICD-10-CM

## 2025-03-28 DIAGNOSIS — Z13.89 SCREENING FOR HEMATURIA OR PROTEINURIA: ICD-10-CM

## 2025-03-28 DIAGNOSIS — Z82.79 FHX: CONGENITAL HEART DISEASE: ICD-10-CM

## 2025-03-28 PROCEDURE — 0502F SUBSEQUENT PRENATAL CARE: CPT | Performed by: STUDENT IN AN ORGANIZED HEALTH CARE EDUCATION/TRAINING PROGRAM

## 2025-03-28 NOTE — PROGRESS NOTES
"Chief Complaint   Patient presents with    Routine Prenatal Visit     12w4d, No problems today       Gladys Tate, a 30 y.o.  at 12w4d, presents for OB follow-up.  She is doing well today.  Denies VB and pain.      Objective  BP Readings from Last 3 Encounters:   25 122/70   25 120/76   25 118/70      Wt Readings from Last 3 Encounters:   25 97.3 kg (214 lb 9.6 oz)   25 94.4 kg (208 lb 3.2 oz)   25 88.9 kg (196 lb)      Total weight gain this pregnancy: 2.994 kg (6 lb 9.6 oz)    General: Awake, alert, no apparent distress  Respiratory: No increased work of breathing  Abdomen: Fundus soft and nontender  Extremity: Nontender, no edema    A/P  Diagnoses and all orders for this visit:    1. 12 weeks gestation of pregnancy (Primary)  -     POC Urinalysis Dipstick    2. Screening for hematuria or proteinuria  -     POC Urinalysis Dipstick    3. Carrier of galactosemia  Overview:  [employer] 14 (Pan-Ethnic Standard) - Blood, Blood, Venous (2025 15:26) pos carrier galactosemia  FOB testing drawn today      4. FHx: Down's syndrome  Overview:  -Older sister with T21.  No other affected siblings.  -cfDNA low risk      5. FHx: congenital heart disease  Overview:  -Younger brother born with \"2 holes in heart\" that required surgical repair soon after birth.  -Since first degree relative of fetus is not affected, no echo required.  We will plan for anatomy US here to determine if MFM referral needed.      6. Obesity in pregnancy, antepartum  Overview:  -TSH and A1c normal  -Plan for serial growth US > 28 wga and weekly BPP > 36wga  -Working hard on maintaining diet/nutrition now that she is off Wegovy.  Discussed referral to Nurtritionist if she is interested.  Declines for now.      7. Supervision of other normal pregnancy, antepartum  Overview:  -PNL:plan for 1hr GTT/CBC/RPR at 24-28wga  -Genetics: cfDNA neg, see carrier screen below, plan for msAFP > 16wga, plan for " anatomy Us at 18-22 wga  -Pap: NIL 8/4/23  -Imm: RNI, VI, tdap > 27wga  -Contraception: discuss at future visit  -Birthplan: discuss at future visit  -Care coordination: accepts blood transfusions, no latex allergy, call schedule reviewed        8. Rubella non-immune status, antepartum  Overview:  Plan for MMR postpartum          SAB precautions reviewed  Return in about 4 weeks (around 4/25/2025) for Next f/u.    Maddy Perea MD

## 2025-04-25 ENCOUNTER — ROUTINE PRENATAL (OUTPATIENT)
Dept: OBSTETRICS AND GYNECOLOGY | Age: 30
End: 2025-04-25
Payer: COMMERCIAL

## 2025-04-25 VITALS — WEIGHT: 222.4 LBS | BODY MASS INDEX: 33.82 KG/M2

## 2025-04-25 DIAGNOSIS — Z34.80 SUPERVISION OF OTHER NORMAL PREGNANCY, ANTEPARTUM: ICD-10-CM

## 2025-04-25 DIAGNOSIS — O99.210 OBESITY IN PREGNANCY, ANTEPARTUM: ICD-10-CM

## 2025-04-25 DIAGNOSIS — Z82.79 FHX: CONGENITAL HEART DISEASE: ICD-10-CM

## 2025-04-25 DIAGNOSIS — Z82.79 FHX: DOWN'S SYNDROME: ICD-10-CM

## 2025-04-25 DIAGNOSIS — Z28.39 RUBELLA NON-IMMUNE STATUS, ANTEPARTUM: ICD-10-CM

## 2025-04-25 DIAGNOSIS — Z14.8 CARRIER OF GALACTOSEMIA: ICD-10-CM

## 2025-04-25 DIAGNOSIS — Z13.89 SCREENING FOR HEMATURIA OR PROTEINURIA: ICD-10-CM

## 2025-04-25 DIAGNOSIS — Z3A.16 16 WEEKS GESTATION OF PREGNANCY: Primary | ICD-10-CM

## 2025-04-25 DIAGNOSIS — O09.899 RUBELLA NON-IMMUNE STATUS, ANTEPARTUM: ICD-10-CM

## 2025-04-25 NOTE — PROGRESS NOTES
"Chief Complaint   Patient presents with    Routine Prenatal Visit     16w4d,        Gladys Tate, a 30 y.o.  at 16w4d, presents for OB follow-up.  She is doing well today.  Denies VB and pain.    Objective  BP Readings from Last 3 Encounters:   25 122/70   25 120/76   25 118/70      Wt Readings from Last 3 Encounters:   25 101 kg (222 lb 6.4 oz)   25 97.3 kg (214 lb 9.6 oz)   25 94.4 kg (208 lb 3.2 oz)      Total weight gain this pregnancy: 6.532 kg (14 lb 6.4 oz)    General: Awake, alert, no apparent distress  Respiratory: No increased work of breathing  Abdomen: Fundus soft and nontender  Extremity: Nontender, no edema    A/P  Diagnoses and all orders for this visit:    1. 16 weeks gestation of pregnancy (Primary)  -     POC Urinalysis Dipstick  -     Alpha Fetoprotein, Maternal    2. Screening for hematuria or proteinuria  -     POC Urinalysis Dipstick    3. Carrier of galactosemia  Overview:  [employer] 14 (Pan-Ethnic Standard) - Blood, Blood, Venous (2025 15:26) pos carrier galactosemia  LABORATORY - SCAN (2025) FOB testing neg      4. FHx: Down's syndrome  Overview:  -Older sister with T21.  No other affected siblings.  -cfDNA low risk      5. FHx: congenital heart disease  Overview:  -Younger brother born with \"2 holes in heart\" that required surgical repair soon after birth.  -Since first degree relative of fetus is not affected, no echo required.  We will plan for anatomy US here to determine if MFM referral needed.      6. Obesity in pregnancy, antepartum  Overview:  -TSH and A1c normal  -Plan for serial growth US > 28 wga and weekly BPP > 36wga  -Working hard on maintaining diet/nutrition now that she is off Wegovy.  Discussed referral to Nurtritionist if she is interested.  Declines for now.      7. Supervision of other normal pregnancy, antepartum  Overview:  -PNL:plan for 1hr GTT/CBC/RPR at 24-28wga  -Genetics: cfDNA neg, see carrier " screen below, msAFP today, plan for anatomy Us at next visit  -Pap: NIL 8/4/23  -Imm: RNI, VI, tdap > 27wga  -Contraception: discuss at future visit  -Birthplan: discuss at future visit  -Care coordination: accepts blood transfusions, no latex allergy, call schedule reviewed        8. Rubella non-immune status, antepartum  Overview:  Plan for MMR postpartum          SAB precautions reviewed  Return in about 3 weeks (around 5/16/2025) for Next f/u with anatomy Us.    Maddy Perea MD

## 2025-04-30 LAB
AFP INTERP SERPL-IMP: NORMAL
AFP INTERP SERPL-IMP: NORMAL
AFP MOM SERPL: 1.4
AFP SERPL-MCNC: 39.7 NG/ML
AGE AT DELIVERY: 31.6 YR
GA METHOD: NORMAL
GA: 16.6 WEEKS
IDDM PATIENT QL: NO
LABORATORY COMMENT REPORT: NORMAL
MULTIPLE PREGNANCY: NO
NEURAL TUBE DEFECT RISK FETUS: 3600 %
RESULT: NORMAL

## 2025-05-16 ENCOUNTER — ROUTINE PRENATAL (OUTPATIENT)
Dept: OBSTETRICS AND GYNECOLOGY | Age: 30
End: 2025-05-16
Payer: COMMERCIAL

## 2025-05-16 VITALS — DIASTOLIC BLOOD PRESSURE: 72 MMHG | BODY MASS INDEX: 34.42 KG/M2 | WEIGHT: 226.4 LBS | SYSTOLIC BLOOD PRESSURE: 124 MMHG

## 2025-05-16 DIAGNOSIS — Z82.79 FHX: DOWN'S SYNDROME: ICD-10-CM

## 2025-05-16 DIAGNOSIS — O99.210 OBESITY IN PREGNANCY, ANTEPARTUM: ICD-10-CM

## 2025-05-16 DIAGNOSIS — Z14.8 CARRIER OF GALACTOSEMIA: ICD-10-CM

## 2025-05-16 DIAGNOSIS — Z28.39 RUBELLA NON-IMMUNE STATUS, ANTEPARTUM: ICD-10-CM

## 2025-05-16 DIAGNOSIS — Z82.79 FHX: CONGENITAL HEART DISEASE: ICD-10-CM

## 2025-05-16 DIAGNOSIS — O09.899 RUBELLA NON-IMMUNE STATUS, ANTEPARTUM: ICD-10-CM

## 2025-05-16 DIAGNOSIS — Z34.80 SUPERVISION OF OTHER NORMAL PREGNANCY, ANTEPARTUM: ICD-10-CM

## 2025-05-16 DIAGNOSIS — Z13.89 SCREENING FOR HEMATURIA OR PROTEINURIA: ICD-10-CM

## 2025-05-16 DIAGNOSIS — Z3A.19 19 WEEKS GESTATION OF PREGNANCY: Primary | ICD-10-CM

## 2025-05-16 NOTE — PROGRESS NOTES
"Chief Complaint   Patient presents with    Routine Prenatal Visit     19w4d, Anatomy scan today, No problems today       Gladys Tate, a 30 y.o.  at 19w4d, presents for OB follow-up.  She is doing well today.  Denies VB and pain.  No FM yet.  Feeling great.    Objective  BP Readings from Last 3 Encounters:   25 124/72   25 122/70   25 120/76      Wt Readings from Last 3 Encounters:   25 103 kg (226 lb 6.4 oz)   25 101 kg (222 lb 6.4 oz)   25 97.3 kg (214 lb 9.6 oz)      Total weight gain this pregnancy: 8.346 kg (18 lb 6.4 oz)    General: Awake, alert, no apparent distress  Respiratory: No increased work of breathing  Abdomen: Fundus soft and nontender  Extremity: Nontender, no edema    A/P  Diagnoses and all orders for this visit:    1. 19 weeks gestation of pregnancy (Primary)  -     POC Urinalysis Dipstick    2. Screening for hematuria or proteinuria  -     POC Urinalysis Dipstick    3. Carrier of galactosemia  Overview:  [employer] Horizon 14 (Pan-Ethnic Standard) - Blood, Blood, Venous (2025 15:26) pos carrier galactosemia  LABORATORY - SCAN (2025) FOB testing neg      4. FHx: Down's syndrome  Overview:  -Older sister with T21.  No other affected siblings.  -cfDNA low risk      5. FHx: congenital heart disease  Overview:  -Younger brother born with \"2 holes in heart\" that required surgical repair soon after birth.  -Since first degree relative of fetus is not affected, no echo required.  We will plan for anatomy US here to determine if MFM referral needed.      6. Obesity in pregnancy, antepartum  Overview:  -TSH and A1c normal  -Plan for serial growth US > 28 wga and weekly BPP > 36wga  -Working hard on maintaining diet/nutrition now that she is off Wegovy.  Discussed referral to Nurtritionist if she is interested.  Declines for now.      7. Supervision of other normal pregnancy, antepartum  Overview:  -PNL:plan for 1hr GTT/CBC/RPR at " 24-28wga  -Genetics: cfDNA/msAFP neg, see carrier screen below, anatomy US normal but incomplete - repeat at next visit  -Pap: NIL 8/4/23  -Imm: RNI, VI, tdap > 27wga  -Contraception: discuss at future visit  -Birthplan: discuss at future visit  -Care coordination: accepts blood transfusions, no latex allergy, call schedule reviewed        8. Rubella non-immune status, antepartum  Overview:  Plan for MMR postpartum          SAB precautions reviewed  Return in about 4 weeks (around 6/13/2025) for Next f/u with anatomy Us with NP then 4wks later for 1hr GTT/CBC/RPR.    Maddy Perea MD

## 2025-05-30 ENCOUNTER — PATIENT MESSAGE (OUTPATIENT)
Dept: OBSTETRICS AND GYNECOLOGY | Age: 30
End: 2025-05-30
Payer: COMMERCIAL

## 2025-06-12 ENCOUNTER — ROUTINE PRENATAL (OUTPATIENT)
Dept: OBSTETRICS AND GYNECOLOGY | Age: 30
End: 2025-06-12
Payer: COMMERCIAL

## 2025-06-12 VITALS — BODY MASS INDEX: 35.88 KG/M2 | SYSTOLIC BLOOD PRESSURE: 122 MMHG | WEIGHT: 236 LBS | DIASTOLIC BLOOD PRESSURE: 74 MMHG

## 2025-06-12 DIAGNOSIS — Z28.39 RUBELLA NON-IMMUNE STATUS, ANTEPARTUM: ICD-10-CM

## 2025-06-12 DIAGNOSIS — Z82.79 FHX: DOWN'S SYNDROME: ICD-10-CM

## 2025-06-12 DIAGNOSIS — Z13.89 SCREENING FOR BLOOD OR PROTEIN IN URINE: ICD-10-CM

## 2025-06-12 DIAGNOSIS — Z34.80 SUPERVISION OF OTHER NORMAL PREGNANCY, ANTEPARTUM: Primary | ICD-10-CM

## 2025-06-12 DIAGNOSIS — O09.899 RUBELLA NON-IMMUNE STATUS, ANTEPARTUM: ICD-10-CM

## 2025-06-12 DIAGNOSIS — Z14.8 CARRIER OF GALACTOSEMIA: ICD-10-CM

## 2025-06-12 DIAGNOSIS — O99.210 OBESITY IN PREGNANCY, ANTEPARTUM: ICD-10-CM

## 2025-06-12 DIAGNOSIS — Z82.79 FHX: CONGENITAL HEART DISEASE: ICD-10-CM

## 2025-06-12 LAB
BILIRUB BLD-MCNC: NEGATIVE MG/DL
CLARITY, POC: CLEAR
COLOR UR: YELLOW
GLUCOSE UR STRIP-MCNC: NEGATIVE MG/DL
KETONES UR QL: ABNORMAL
LEUKOCYTE EST, POC: NEGATIVE
NITRITE UR-MCNC: NEGATIVE MG/ML
PH UR: 6 [PH] (ref 5–8)
PROT UR STRIP-MCNC: NEGATIVE MG/DL
RBC # UR STRIP: NEGATIVE /UL
SP GR UR: 1.02 (ref 1–1.03)
UROBILINOGEN UR QL: ABNORMAL

## 2025-06-12 NOTE — PROGRESS NOTES
Chief Complaint   Patient presents with    Routine Prenatal Visit     23w3d Ob Check with U/S     Gladys Lila Tate, a 30 y.o.  at 23w3d, presents for OB follow-up.  Patient of Dr. Perea. She is doing well today.  Denies loss of fluid, vaginal bleeding, and contractions.  Endorses fetal movements.  Repeat anatomy today is normal/complete.    Relevant data reviewed:  US OB Follow Up Transabdominal Approach (2025 14:14)     Objective  BP Readings from Last 3 Encounters:   25 122/74   25 124/72   25 122/70      Wt Readings from Last 3 Encounters:   25 107 kg (236 lb)   25 103 kg (226 lb 6.4 oz)   25 101 kg (222 lb 6.4 oz)      Total weight gain this pregnancy: 12.7 kg (28 lb)    Last OB US Data (since 2024)         Value Time User    Anatomic survey  NL/Complete 2025  2:28 PM Kasandra Lackey PA-C    Placenta location  Anterior 2025  2:28 PM Kasandra Lackey PA-C    Previa  no previa 2025  2:28 PM Kasandra Lackey PA-C    EFW%  41.3%ile 2025  2:28 PM Kasandra Lackey PA-C    AC%  44.6%ile 2025  2:28 PM Kasandra Lackey PA-C            Review of systems:   Gen: negative  CV:     negative  GI: negative  :   negative and good fetal movement noted   MS:    negative  Neuro: negative  Pul: negative    Physical Exam  Constitutional:       General: She is not in acute distress.  Cardiovascular:      Rate and Rhythm: Normal rate and regular rhythm.   Pulmonary:      Effort: Pulmonary effort is normal.      Breath sounds: Normal breath sounds.   Abdominal:      Palpations: Abdomen is soft.      Tenderness: There is no abdominal tenderness.   Skin:     General: Skin is warm.   Neurological:      Mental Status: She is alert and oriented to person, place, and time.   Psychiatric:         Mood and Affect: Mood normal.         Behavior: Behavior normal.         Thought Content: Thought content normal.         Judgment:  "Judgment normal.         A/P  Diagnoses and all orders for this visit:    1. Supervision of other normal pregnancy, antepartum (Primary)  Overview:  -PNL:plan for 1hr GTT/CBC/RPR at 24-28wga  -Genetics: cfDNA/msAFP neg, see carrier screen below, repeat anatomy US normal / complete  -Pap: NIL 23  -Imm: RNI, VI, tdap > 27wga  -Contraception: discuss at future visit  -Birthplan: discuss at future visit  -Care coordination: accepts blood transfusions, no latex allergy, call schedule reviewed        2. Screening for blood or protein in urine  -     POC Urinalysis Dipstick    3. Obesity in pregnancy, antepartum  Overview:  -TSH and A1c normal  -Plan for serial growth US > 28 wga and weekly BPP > 36wga  -Working hard on maintaining diet/nutrition now that she is off Wegovy.  Discussed referral to Nurtritionist if she is interested.  Declines for now.      4. FHx: Down's syndrome  Overview:  -Older sister with T21.  No other affected siblings.  -cfDNA low risk      5. FHx: congenital heart disease  Overview:  -Younger brother born with \"2 holes in heart\" that required surgical repair soon after birth.  -Since first degree relative of fetus is not affected, no echo required.  We will plan for anatomy US here to determine if MFM referral needed.      6. Rubella non-immune status, antepartum  Overview:  Plan for MMR postpartum      7. Carrier of galactosemia  Overview:  [employer] Horizon 14 (Pan-Ethnic Standard) - Blood, Blood, Venous (2025 15:26) pos carrier galactosemia  LABORATORY - SCAN (2025) FOB testing neg          Pre-eclampsia symptoms were discussed and warnings were given.   labor was discussed.  Warnings were provided.  1 hr GTT/CBC/RPR next visit in 4 wks.   Return for Next scheduled follow up. - in 4 wks for OB f/u with Dr. Perea.     Kasandra Lackey PA-C  "

## 2025-07-11 ENCOUNTER — ROUTINE PRENATAL (OUTPATIENT)
Dept: OBSTETRICS AND GYNECOLOGY | Age: 30
End: 2025-07-11
Payer: COMMERCIAL

## 2025-07-11 VITALS — SYSTOLIC BLOOD PRESSURE: 122 MMHG | DIASTOLIC BLOOD PRESSURE: 70 MMHG | BODY MASS INDEX: 36.25 KG/M2 | WEIGHT: 238.4 LBS

## 2025-07-11 DIAGNOSIS — Z82.79 FHX: CONGENITAL HEART DISEASE: ICD-10-CM

## 2025-07-11 DIAGNOSIS — Z13.0 SCREENING FOR IRON DEFICIENCY ANEMIA: ICD-10-CM

## 2025-07-11 DIAGNOSIS — Z13.1 DIABETES MELLITUS SCREENING: ICD-10-CM

## 2025-07-11 DIAGNOSIS — O99.210 OBESITY IN PREGNANCY, ANTEPARTUM: ICD-10-CM

## 2025-07-11 DIAGNOSIS — O26.849 UTERINE SIZE DATE DISCREPANCY PREGNANCY: ICD-10-CM

## 2025-07-11 DIAGNOSIS — Z14.8 CARRIER OF GALACTOSEMIA: ICD-10-CM

## 2025-07-11 DIAGNOSIS — O09.899 RUBELLA NON-IMMUNE STATUS, ANTEPARTUM: ICD-10-CM

## 2025-07-11 DIAGNOSIS — Z28.39 RUBELLA NON-IMMUNE STATUS, ANTEPARTUM: ICD-10-CM

## 2025-07-11 DIAGNOSIS — Z34.80 SUPERVISION OF OTHER NORMAL PREGNANCY, ANTEPARTUM: ICD-10-CM

## 2025-07-11 DIAGNOSIS — Z82.79 FHX: DOWN'S SYNDROME: ICD-10-CM

## 2025-07-11 DIAGNOSIS — Z13.89 SCREENING FOR BLOOD OR PROTEIN IN URINE: ICD-10-CM

## 2025-07-11 DIAGNOSIS — Z3A.27 27 WEEKS GESTATION OF PREGNANCY: Primary | ICD-10-CM

## 2025-07-11 NOTE — PROGRESS NOTES
"Chief Complaint   Patient presents with    Routine Prenatal Visit     27w4d, 1hr gtt today, Tdap needed, No problems today       Gladys Tate, a 30 y.o.  at 27w4d, presents for OB follow-up.  She is doing well today.  Denies loss of fluid, vaginal bleeding, and contractions.  Endorses fetal movements.    Objective  BP Readings from Last 3 Encounters:   25 122/70   25 122/74   25 124/72      Wt Readings from Last 3 Encounters:   25 108 kg (238 lb 6.4 oz)   25 107 kg (236 lb)   25 103 kg (226 lb 6.4 oz)      Total weight gain this pregnancy: 13.8 kg (30 lb 6.4 oz)    General: Awake, alert, no apparent distress  Respiratory: No increased work of breathing  Abdomen: Fundus soft and nontender  Extremity: Nontender, no edema    A/P  Diagnoses and all orders for this visit:    1. 27 weeks gestation of pregnancy (Primary)  -     POC Urinalysis Dipstick  -     Gestational Screen 1 Hr ([facility])  -     CBC (No Diff)  -     Treponema pallidum AB w/Reflex RPR    2. Screening for blood or protein in urine  -     POC Urinalysis Dipstick    3. Diabetes mellitus screening  -     Gestational Screen 1 Hr ([facility])    4. Screening for iron deficiency anemia  -     CBC (No Diff)    5. Carrier of galactosemia  Overview:  [employer] Horizon 14 (Pan-Ethnic Standard) - Blood, Blood, Venous (2025 15:26) pos carrier galactosemia  LABORATORY - SCAN (2025) FOB testing neg      6. FHx: Down's syndrome  Overview:  -Older sister with T21.  No other affected siblings.  -cfDNA low risk      7. FHx: congenital heart disease  Overview:  -Younger brother born with \"2 holes in heart\" that required surgical repair soon after birth.  -Since first degree relative of fetus is not affected, no echo required.  We will plan for anatomy US here to determine if MFM referral needed.      8. Obesity in pregnancy, antepartum  Overview:  -TSH and A1c normal  -Plan for serial growth US > 28 wga and weekly " BPP > 36wga  -Working hard on maintaining diet/nutrition now that she is off Wegovy.  Discussed referral to Nurtritionist if she is interested.  Declines for now.      9. Supervision of other normal pregnancy, antepartum  Overview:  -PNL: 1hr GTT/CBC/RPR today  -Genetics: cfDNA/msAFP neg, see carrier screen below, repeat anatomy US normal / complete  -Pap: NIL 8/4/23  -Imm: RNI, VI, tdap today  -Contraception: discuss at future visit  -Birthplan: discuss at future visit  -Care coordination: accepts blood transfusions, no latex allergy, call schedule reviewed  -Wants to avoid IOL and epidural if possible        10. Rubella non-immune status, antepartum  Overview:  Plan for MMR postpartum      11. Uterine size date discrepancy pregnancy  Overview:  S>D  Growth US at next visit      Other orders  -     Tdap Vaccine Greater Than or Equal To 6yo IM        Labor precautions reviewed  Return for appt 3 wks then q2 wks x 3 then weekly appt/BPP x 3, growth US with next visit.    Maddy Perea MD

## 2025-07-14 ENCOUNTER — OFFICE VISIT (OUTPATIENT)
Dept: INTERNAL MEDICINE | Facility: CLINIC | Age: 30
End: 2025-07-14
Payer: COMMERCIAL

## 2025-07-14 VITALS
HEART RATE: 63 BPM | DIASTOLIC BLOOD PRESSURE: 72 MMHG | TEMPERATURE: 98.3 F | HEIGHT: 68 IN | WEIGHT: 241 LBS | RESPIRATION RATE: 16 BRPM | BODY MASS INDEX: 36.53 KG/M2 | SYSTOLIC BLOOD PRESSURE: 124 MMHG | OXYGEN SATURATION: 98 %

## 2025-07-14 DIAGNOSIS — O99.210 OBESITY IN PREGNANCY: Primary | ICD-10-CM

## 2025-07-14 PROCEDURE — 99213 OFFICE O/P EST LOW 20 MIN: CPT | Performed by: INTERNAL MEDICINE

## 2025-07-14 NOTE — PROGRESS NOTES
"Chief Complaint  Annual Exam    Subjective        Gladys Tate presents to [facility]  History of Present Illness     History of Present Illness  The patient is a 30-year-old female who is pregnant, presenting for a follow-up visit.    She is currently 28 weeks pregnant and reports no complications. She has not experienced any nausea during this pregnancy, which she describes as the easiest of her pregnancies. Her sleep is generally good, although she does experience hip pain. She reports no constipation. Her most recent OB visit was on Friday, 07/11/2025, where she was informed that everything appears normal. She is expecting a girl with an estimated due date of 10/06/2025. She underwent gestational blood work on 07/11/2025 but has not yet received the results. Her urine test showed no protein. Her blood pressure has been stable, and she is unsure about her blood sugar levels. She has not experienced palpitations. She reports no swelling, but notes that her fingers feel tight depending on her water intake. She recalls an incident where her feet swelled after spending time outside in hot weather without adequate hydration. She increased her water and electrolyte intake after being informed of protein in her urine. She is uncertain about recent thyroid tests. She expresses interest in breastfeeding but is unsure if she will be able to due to a previous reduction procedure. She plans to use condoms for birth control postpartum. She has gained 15 pounds during this pregnancy. She has been off her injection for 6 to 8 months and plans to resume it after breastfeeding. Her diet remains unchanged. She is taking prenatal vitamins.    Sleep: Reports generally good sleep, although experiences hip pain.    PAST SURGICAL HISTORY:  Breast reduction procedure.      Objective   Vital Signs:  /72   Pulse 63   Temp 98.3 °F (36.8 °C) (Oral)   Resp 16   Ht 172.7 cm (67.99\")   Wt 109 " "kg (241 lb)   SpO2 98%   BMI 36.65 kg/m²   Estimated body mass index is 36.65 kg/m² as calculated from the following:    Height as of this encounter: 172.7 cm (67.99\").    Weight as of this encounter: 109 kg (241 lb).          Past Medical History:   Diagnosis Date    Macromastia 06/03/2014    Obesity         Family History   Problem Relation Age of Onset    Hypertension Father     Hyperlipidemia Father     No Known Problems Mother     Breast cancer Paternal Grandmother 70    Cancer Paternal Grandmother     Hypertension Paternal Grandmother     Hypertension Maternal Grandfather     Developmental Disability Sister     Developmental Disability Brother     Ovarian cancer Neg Hx     Uterine cancer Neg Hx     Colon cancer Neg Hx         Social History     Socioeconomic History    Marital status:    Tobacco Use    Smoking status: Never     Passive exposure: Never    Smokeless tobacco: Never   Vaping Use    Vaping status: Never Used   Substance and Sexual Activity    Alcohol use: Not Currently     Comment: Socially    Drug use: No    Sexual activity: Yes     Partners: Male     Birth control/protection: None        Review of Systems     Physical Exam  Vitals reviewed.   Constitutional:       Appearance: Normal appearance. She is obese.   HENT:      Head: Normocephalic and atraumatic.   Eyes:      Extraocular Movements: Extraocular movements intact.      Conjunctiva/sclera: Conjunctivae normal.      Pupils: Pupils are equal, round, and reactive to light.   Cardiovascular:      Rate and Rhythm: Normal rate and regular rhythm.      Pulses: Normal pulses.   Pulmonary:      Effort: Pulmonary effort is normal.   Musculoskeletal:      Cervical back: Normal range of motion and neck supple.   Neurological:      General: No focal deficit present.      Mental Status: She is alert. Mental status is at baseline.   Psychiatric:         Mood and Affect: Mood normal.         Behavior: Behavior normal.         Thought Content: " Thought content normal.          Result Review :    Common labs          1/13/2025    10:57 2/28/2025    13:47   Common Labs   Glucose 81     BUN 13     Creatinine 0.74     Sodium 138     Potassium 4.1     Chloride 101     Calcium 9.4     Albumin 4.4     Total Bilirubin 0.7     Alkaline Phosphatase 59     AST (SGOT) 23     ALT (SGPT) 13     WBC  11.6    Hemoglobin 13.6  12.7    Hematocrit 40.6  37.0    Platelets  304    Hemoglobin A1C  5.0                Assessment and Plan   There are no diagnoses linked to this encounter.    Assessment & Plan  1. Pregnancy.  - Currently 28 weeks pregnant, reports no issues such as nausea or constipation, experiences some hip pain.  - Recent OB visit on 07/11/2025 indicated everything looks good, expecting a girl, gestational blood work and glucose tolerance test done on 07/11/2025, results pending.  - Urinalysis showed no protein, blood pressure stable at 124/72 mmHg, gained 15 pounds since 05/2025, within normal range.  - Advised to continue taking prenatal vitamins, maintain adequate hydration to prevent swelling, schedule a physical examination approximately 3 to 4 weeks postpartum to discuss resuming weight loss injection.            Follow Up   No follow-ups on file.  Patient was given instructions and counseling regarding her condition or for health maintenance advice. Please see specific information pulled into the AVS if appropriate.           Patient or patient representative verbalized consent for the use of Ambient Listening during the visit with  Reji Yang MD for chart documentation. 7/14/2025  09:42 EDT

## 2025-07-17 ENCOUNTER — RESULTS FOLLOW-UP (OUTPATIENT)
Dept: OBSTETRICS AND GYNECOLOGY | Age: 30
End: 2025-07-17
Payer: COMMERCIAL

## 2025-07-17 LAB
ERYTHROCYTE [DISTWIDTH] IN BLOOD BY AUTOMATED COUNT: 12 % (ref 11.7–15.4)
GLUCOSE 1H P 50 G GLC PO SERPL-MCNC: 143 MG/DL (ref 70–139)
HCT VFR BLD AUTO: 38.7 % (ref 34–46.6)
HGB BLD-MCNC: 12.9 G/DL (ref 11.1–15.9)
MCH RBC QN AUTO: 32 PG (ref 26.6–33)
MCHC RBC AUTO-ENTMCNC: 33.3 G/DL (ref 31.5–35.7)
MCV RBC AUTO: 96 FL (ref 79–97)
PLATELET # BLD AUTO: 240 X10E3/UL (ref 150–450)
RBC # BLD AUTO: 4.03 X10E6/UL (ref 3.77–5.28)
REQUEST PROBLEM: NORMAL
TREPONEMA PALLIDUM IGG+IGM AB [PRESENCE] IN SERUM OR PLASMA BY IMMUNOASSAY: NORMAL
WBC # BLD AUTO: 10 X10E3/UL (ref 3.4–10.8)

## 2025-07-17 NOTE — PROGRESS NOTES
Please call patient to let her know 1hr GTT was elevated and schedule 3 hr GTT while fasting for 8 hours.    Thank you,  Maddy Perea MD  07/17/25  15:00 EDT

## 2025-08-01 ENCOUNTER — ROUTINE PRENATAL (OUTPATIENT)
Dept: OBSTETRICS AND GYNECOLOGY | Age: 30
End: 2025-08-01
Payer: COMMERCIAL

## 2025-08-01 VITALS — BODY MASS INDEX: 36.96 KG/M2 | SYSTOLIC BLOOD PRESSURE: 122 MMHG | DIASTOLIC BLOOD PRESSURE: 80 MMHG | WEIGHT: 243 LBS

## 2025-08-01 DIAGNOSIS — O99.210 OBESITY IN PREGNANCY, ANTEPARTUM: ICD-10-CM

## 2025-08-01 DIAGNOSIS — O26.849 UTERINE SIZE DATE DISCREPANCY PREGNANCY: ICD-10-CM

## 2025-08-01 DIAGNOSIS — Z14.8 CARRIER OF GALACTOSEMIA: ICD-10-CM

## 2025-08-01 DIAGNOSIS — O09.899 RUBELLA NON-IMMUNE STATUS, ANTEPARTUM: ICD-10-CM

## 2025-08-01 DIAGNOSIS — Z82.79 FHX: DOWN'S SYNDROME: ICD-10-CM

## 2025-08-01 DIAGNOSIS — Z34.80 SUPERVISION OF OTHER NORMAL PREGNANCY, ANTEPARTUM: ICD-10-CM

## 2025-08-01 DIAGNOSIS — Z82.79 FHX: CONGENITAL HEART DISEASE: ICD-10-CM

## 2025-08-01 DIAGNOSIS — O99.810 ABNORMAL GLUCOSE TOLERANCE TEST (GTT) DURING PREGNANCY, ANTEPARTUM: ICD-10-CM

## 2025-08-01 DIAGNOSIS — Z13.89 SCREENING FOR BLOOD OR PROTEIN IN URINE: ICD-10-CM

## 2025-08-01 DIAGNOSIS — Z28.39 RUBELLA NON-IMMUNE STATUS, ANTEPARTUM: ICD-10-CM

## 2025-08-01 DIAGNOSIS — Z3A.30 30 WEEKS GESTATION OF PREGNANCY: Primary | ICD-10-CM

## 2025-08-01 NOTE — PROGRESS NOTES
"Chief Complaint   Patient presents with    Routine Prenatal Visit     30w4d, Growth scan today, No problems today       Gladys Tate, a 30 y.o.  at 30w4d, presents for OB follow-up.  She is doing well today.  Denies loss of fluid, vaginal bleeding, and contractions.  Endorses fetal movements.  She has been having severe leg cramps for the last 3 nights.  Recent CBC showed no signs of anemia.  She takes a magnesium supplement and drinks lots of water.  Discussed adding a banana before bedtime and stretching.    Objective  BP Readings from Last 3 Encounters:   25 122/80   25 124/72   25 122/70      Wt Readings from Last 3 Encounters:   25 110 kg (243 lb)   25 109 kg (241 lb)   25 108 kg (238 lb 6.4 oz)      Total weight gain this pregnancy: 15.9 kg (35 lb)    General: Awake, alert, no apparent distress  Respiratory: No increased work of breathing  Abdomen: Fundus soft and nontender  Extremity: Nontender, no edema    A/P  Diagnoses and all orders for this visit:    1. 30 weeks gestation of pregnancy (Primary)  -     POC Urinalysis Dipstick    2. Screening for blood or protein in urine  -     POC Urinalysis Dipstick    3. FHx: congenital heart disease  Overview:  -Younger brother born with \"2 holes in heart\" that required surgical repair soon after birth.  -Since first degree relative of fetus is not affected, no echo required.  We will plan for anatomy US here to determine if MFM referral needed.      4. FHx: Down's syndrome  Overview:  -Older sister with T21.  No other affected siblings.  -cfDNA low risk      5. Carrier of galactosemia  Overview:  [employer] Horizon 14 (Pan-Ethnic Standard) - Blood, Blood, Venous (2025 15:26) pos carrier galactosemia  LABORATORY - SCAN (2025) FOB testing neg      6. Obesity in pregnancy, antepartum  Overview:  -TSH and A1c normal  -Plan for serial growth US, normal at 30wga, repeat at 34wga  -Plan for weekly BPP > " 36wga  -Working hard on maintaining diet/nutrition now that she is off Wegovy.  Discussed referral to Nurtritionist if she is interested.  Declines for now.      7. Abnormal glucose tolerance test (GTT) during pregnancy, antepartum  Overview:  Abnormal 1 hour with normal 3-hour      8. Supervision of other normal pregnancy, antepartum  Overview:  -PNL: plan for GBS at 36 wga, needs T pal  -Genetics: cfDNA/msAFP neg, see carrier screen below, repeat anatomy US normal / complete  -Pap: NIL 8/4/23  -Imm: RNI, VI, s/p tdap  -Contraception: discuss at future visit  -Birthplan: discuss at future visit  -Care coordination: accepts blood transfusions, no latex allergy, call schedule reviewed  -Wants to avoid IOL and epidural if possible        9. Rubella non-immune status, antepartum  Overview:  Plan for MMR postpartum      10. Uterine size date discrepancy pregnancy  Overview:  S>D  Growth US normal at 30wga          Labor precautions and FKC reviewed  Return in about 2 weeks (around 8/15/2025) for Next f/u.    Maddy Perea MD

## 2025-08-15 ENCOUNTER — ROUTINE PRENATAL (OUTPATIENT)
Dept: OBSTETRICS AND GYNECOLOGY | Age: 30
End: 2025-08-15
Payer: COMMERCIAL

## 2025-08-15 VITALS — SYSTOLIC BLOOD PRESSURE: 124 MMHG | DIASTOLIC BLOOD PRESSURE: 68 MMHG | WEIGHT: 242.4 LBS | BODY MASS INDEX: 36.87 KG/M2

## 2025-08-15 DIAGNOSIS — Z34.80 SUPERVISION OF OTHER NORMAL PREGNANCY, ANTEPARTUM: ICD-10-CM

## 2025-08-15 DIAGNOSIS — Z82.79 FHX: DOWN'S SYNDROME: ICD-10-CM

## 2025-08-15 DIAGNOSIS — O99.810 ABNORMAL GLUCOSE TOLERANCE TEST (GTT) DURING PREGNANCY, ANTEPARTUM: ICD-10-CM

## 2025-08-15 DIAGNOSIS — O09.899 RUBELLA NON-IMMUNE STATUS, ANTEPARTUM: ICD-10-CM

## 2025-08-15 DIAGNOSIS — O99.210 OBESITY IN PREGNANCY, ANTEPARTUM: ICD-10-CM

## 2025-08-15 DIAGNOSIS — Z13.89 SCREENING FOR BLOOD OR PROTEIN IN URINE: ICD-10-CM

## 2025-08-15 DIAGNOSIS — Z3A.32 32 WEEKS GESTATION OF PREGNANCY: Primary | ICD-10-CM

## 2025-08-15 DIAGNOSIS — O26.849 UTERINE SIZE DATE DISCREPANCY PREGNANCY: ICD-10-CM

## 2025-08-15 DIAGNOSIS — Z28.39 RUBELLA NON-IMMUNE STATUS, ANTEPARTUM: ICD-10-CM

## 2025-08-15 DIAGNOSIS — Z14.8 CARRIER OF GALACTOSEMIA: ICD-10-CM

## 2025-08-15 DIAGNOSIS — Z82.79 FHX: CONGENITAL HEART DISEASE: ICD-10-CM

## 2025-08-29 ENCOUNTER — ROUTINE PRENATAL (OUTPATIENT)
Dept: OBSTETRICS AND GYNECOLOGY | Age: 30
End: 2025-08-29
Payer: COMMERCIAL

## 2025-08-29 VITALS — BODY MASS INDEX: 38.2 KG/M2 | WEIGHT: 251.2 LBS | SYSTOLIC BLOOD PRESSURE: 138 MMHG | DIASTOLIC BLOOD PRESSURE: 82 MMHG

## 2025-08-29 DIAGNOSIS — Z82.79 FHX: DOWN'S SYNDROME: ICD-10-CM

## 2025-08-29 DIAGNOSIS — Z28.39 RUBELLA NON-IMMUNE STATUS, ANTEPARTUM: ICD-10-CM

## 2025-08-29 DIAGNOSIS — O99.210 OBESITY IN PREGNANCY, ANTEPARTUM: ICD-10-CM

## 2025-08-29 DIAGNOSIS — O99.810 ABNORMAL GLUCOSE TOLERANCE TEST (GTT) DURING PREGNANCY, ANTEPARTUM: ICD-10-CM

## 2025-08-29 DIAGNOSIS — Z34.80 SUPERVISION OF OTHER NORMAL PREGNANCY, ANTEPARTUM: ICD-10-CM

## 2025-08-29 DIAGNOSIS — O26.849 UTERINE SIZE DATE DISCREPANCY PREGNANCY: ICD-10-CM

## 2025-08-29 DIAGNOSIS — Z13.89 SCREENING FOR BLOOD OR PROTEIN IN URINE: ICD-10-CM

## 2025-08-29 DIAGNOSIS — Z82.79 FHX: CONGENITAL HEART DISEASE: ICD-10-CM

## 2025-08-29 DIAGNOSIS — Z3A.34 34 WEEKS GESTATION OF PREGNANCY: Primary | ICD-10-CM

## 2025-08-29 DIAGNOSIS — O09.899 RUBELLA NON-IMMUNE STATUS, ANTEPARTUM: ICD-10-CM

## 2025-08-29 DIAGNOSIS — Z14.8 CARRIER OF GALACTOSEMIA: ICD-10-CM
